# Patient Record
Sex: FEMALE | URBAN - METROPOLITAN AREA
[De-identification: names, ages, dates, MRNs, and addresses within clinical notes are randomized per-mention and may not be internally consistent; named-entity substitution may affect disease eponyms.]

---

## 2017-03-31 ENCOUNTER — TELEPHONE (OUTPATIENT)
Dept: FAMILY MEDICINE | Facility: CLINIC | Age: 42
End: 2017-03-31

## 2017-03-31 NOTE — TELEPHONE ENCOUNTER
Called pharmacy, states i dont see pt has ever been seen here or prescribed a medication.  Pharmacy says pt states this was a house call from Dr. Murphy.  Informed pharmacy that I do not have any info on whether med should be capsule or tablets.  They will have pt contact Dr. Murphy.    Maranda AGUILAR, Patient Care

## 2017-03-31 NOTE — TELEPHONE ENCOUNTER
Reason for Call:  Other prescription    Detailed comments: RX is for capsules or tablets on Tizanadine    Phone Number [facility] can be reached at: [phone]    Best Time: any    Can we leave a detailed message on this number? YES    Call taken on 3/31/2017 at 10:54 AM by Sayra Munguia